# Patient Record
(demographics unavailable — no encounter records)

---

## 2024-10-29 NOTE — REVIEW OF SYSTEMS
[Vision Problems] : vision problems [Nasal Discharge] : nasal discharge [Heartburn] : heartburn [Joint Pain] : joint pain [Back Pain] : back pain [Mole Changes] : mole changes [Dizziness] : dizziness [Fainting] : fainting [Easy Bruising] : easy bruising [Negative] : Heme/Lymph

## 2024-10-29 NOTE — HEALTH RISK ASSESSMENT
[No] : In the past 12 months have you used drugs other than those required for medical reasons? No [No falls in past year] : Patient reported no falls in the past year [Patient refused screening] : Patient refused screening [de-identified] : Surgical oncology, nephrology, endocrine, GYN oncology [de-identified] : Walking [de-identified] : Regular [Reviewed no changes] : Reviewed, no changes [AdvancecareDate] : 10/24 [Never] : Never

## 2024-10-29 NOTE — ASSESSMENT
[FreeTextEntry1] : Abnormal chest CT of unclear etiology Most recent chest CT remains stable See scanned PFTs done today She has declined bronchoscopy with transbronchial biopsy or open lung biopsy for tissue diagnosis Remains asymptomatic from a pulmonary perspective. Will continue to monitor chest CT and PFTs at least annually She will call if her status changes or worsens or for any medical/pulmonary issues and return to be seen immediately She wishes to return for her annual physical and as needed All of the above was discussed with the patient and all questions were answered She verbally confirmed understanding of all of the above and agreement with the above plan

## 2024-10-29 NOTE — HISTORY OF PRESENT ILLNESS
[FreeTextEntry1] : Comes in for follow-up medical visit.  [de-identified] : Had PFTs today and here to discuss.  Reports that she took her influenza and COVID vaccines on Saturday. Feeling well. Denies any chest pain.  Denies any cough or hemoptysis.  Denies any increased shortness of breath or wheezing. No other new complaints today.

## 2024-10-29 NOTE — PHYSICAL EXAM
[No Acute Distress] : no acute distress [Well Nourished] : well nourished [Well Developed] : well developed [Well-Appearing] : well-appearing [Normal Voice/Communication] : normal voice/communication [Normal Sclera/Conjunctiva] : normal sclera/conjunctiva [Normal Outer Ear/Nose] : the outer ears and nose were normal in appearance [No JVD] : no jugular venous distention [Supple] : supple [No Respiratory Distress] : no respiratory distress  [No Accessory Muscle Use] : no accessory muscle use [Clear to Auscultation] : lungs were clear to auscultation bilaterally [Normal Rate] : normal rate  [Regular Rhythm] : with a regular rhythm [Normal S1, S2] : normal S1 and S2 [No Carotid Bruits] : no carotid bruits [No Edema] : there was no peripheral edema [No Extremity Clubbing/Cyanosis] : no extremity clubbing/cyanosis [Declined Breast Exam] : declined breast exam  [Soft] : abdomen soft [Normal Bowel Sounds] : normal bowel sounds [Declined Rectal Exam] : declined rectal exam [No CVA Tenderness] : no CVA  tenderness [No Spinal Tenderness] : no spinal tenderness [No Rash] : no rash [No Focal Deficits] : no focal deficits [Normal Gait] : normal gait [Speech Grossly Normal] : speech grossly normal [Normal Affect] : the affect was normal [Alert and Oriented x3] : oriented to person, place, and time [de-identified] : No stridor [de-identified] : R = 16; good air entry [de-identified] : Normal bilateral radial pulses; no cords [de-identified] : Declined/as per GYN

## 2024-11-07 NOTE — CONSULT LETTER
[Dear  ___] : Dear  [unfilled], [Consult Letter:] : I had the pleasure of evaluating your patient, [unfilled]. [Please see my note below.] : Please see my note below. [Consult Closing:] : Thank you very much for allowing me to participate in the care of this patient.  If you have any questions, please do not hesitate to contact me. [Sincerely,] : Sincerely, [FreeTextEntry3] : Reina Nolen MD, [facility]  of Dermatology Director of Dermatologic Surgery

## 2024-11-07 NOTE — ASSESSMENT
[FreeTextEntry1] : # Actinic keratoses x3 on nose, 3 on forehead    - Discussed nature of lesions and risk of malignant transformation to SCC - Treated with cryotherapy at today's visit Procedure note: Cryotherapy  Verbal consent obtained. The risks/benefits/alternatives discussed including but not limited to risk of pain, inflammatory and blistering reaction, infection, risk of permanent scar and/or dyspigmentation, recurrence, possible lack of efficacy and need for repeat treatments. Site confirmed. 2 lesions treated with cryotherapy: liquid nitrogen x2 rapid freeze-slow thaw cycles. Discussed wound care instructions. Patient tolerated well with no immediate complications.  #Full body skin check for skin cancer screening - no concerning lesions for skin cancer today - I have counseled the patient on the importance of sun protection and monthly self skin exams at home. We have discussed proper sun protection habits and techniques, monthly self-skin exams. I have asked the patient to notify me of any new or changing lesions.   #Multiple benign melanocytic nevi, solar lentigines, cherry angiomas  - ABCDE of melanoma reviewed - reassurance provided regarding nevi - monthly self-skin checks advised  # History of SCC of skin - S/p excision on L upper back 10/2023, [facility] - S/p Mohs on L lateral eyebrow 11/2023, [facility]  RTC in 1 year for TBSE, sooner PRN

## 2024-11-07 NOTE — ASSESSMENT
[FreeTextEntry1] : # Actinic keratoses x3 on nose, 3 on forehead    - Discussed nature of lesions and risk of malignant transformation to SCC - Treated with cryotherapy at today's visit Procedure note: Cryotherapy  Verbal consent obtained. The risks/benefits/alternatives discussed including but not limited to risk of pain, inflammatory and blistering reaction, infection, risk of permanent scar and/or dyspigmentation, recurrence, possible lack of efficacy and need for repeat treatments. Site confirmed. 2 lesions treated with cryotherapy: liquid nitrogen x2 rapid freeze-slow thaw cycles. Discussed wound care instructions. Patient tolerated well with no immediate complications.  #Full body skin check for skin cancer screening - no concerning lesions for skin cancer today - I have counseled the patient on the importance of sun protection and monthly self skin exams at home. We have discussed proper sun protection habits and techniques, monthly self-skin exams. I have asked the patient to notify me of any new or changing lesions.   #Multiple benign melanocytic nevi, solar lentigines, cherry angiomas  - ABCDE of melanoma reviewed - reassurance provided regarding nevi - monthly self-skin checks advised  # History of SCC of skin - S/p excision on L upper back 10/2023, [facility] - S/p Mohs on L lateral eyebrow 11/2023, [facility]  RTC in 1 year for TBSE, sooner PRN

## 2024-11-07 NOTE — CONSULT LETTER
[Dear  ___] : Dear  [unfilled], [Consult Letter:] : I had the pleasure of evaluating your patient, [unfilled]. [Please see my note below.] : Please see my note below. [Consult Closing:] : Thank you very much for allowing me to participate in the care of this patient.  If you have any questions, please do not hesitate to contact me. [Sincerely,] : Sincerely, [FreeTextEntry3] : Reina Nolen MD, [facility]  of Dermatology Director of Dermatologic Surgery

## 2024-11-07 NOTE — PHYSICAL EXAM
[Alert] : alert [Oriented x 3] : ~L oriented x 3 [Well Nourished] : well nourished [Conjunctiva Non-injected] : conjunctiva non-injected [No Visual Lymphadenopathy] : no visual  lymphadenopathy [No Clubbing] : no clubbing [No Edema] : no edema [No Bromhidrosis] : no bromhidrosis [No Chromhidrosis] : no chromhidrosis [FreeTextEntry3] :  Full body skin exam Total body skin exam performed including hair, scalp, face, neck, chest/breasts/axillae, abdomen, back, R upper ext, L upper ext, buttock/groin, R lower ext, L lower ext, hands, feet, digits/nails. The following of note   tan/brown macules scattered throughout trunk/extremities, no concerning features on dermoscopy vascular red papules scattered throughout trunk/extremities erythematous papules, gritty scale on nose and forehead  healing pink linear scar on L forehead healing linear scar on L upper back/posterior shoulder

## 2024-11-07 NOTE — HISTORY OF PRESENT ILLNESS
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 S/p LN2 of AKs on L cheek at [facility]  #Moles none new or concerning today requesting FBSE   Skin cancer hx: as above (SCC L upper back s/p excision 10/2023, SCC L eyebrow s/p Mohs 11/2023) Social hx: lives in [place]

## 2024-11-07 NOTE — HISTORY OF PRESENT ILLNESS
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 S/p LN2 of AKs on L cheek at [facility]  #Moles none new or concerning today requesting FBSE   Skin cancer hx: as above (SCC L upper back s/p excision 10/2023, SCC L eyebrow s/p Mohs 11/2023) Social hx: lives in [place]

## 2024-11-22 NOTE — ASSESSMENT
[FreeTextEntry1] : Ms. Padron is an 82 y/o woman with h/o endometrial cancer ( Stage 1A) s/p surgery, long standing DM, HTN and CKD here for a follow up visit  #CKD (stage 3): - Her CKD is likely secondary to long standing DM/HTN. Urine MAlb/Cr: normal   - Baseline creatinine has been ranging between 1.6-1.9 mg/dl. 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  She no longer has episodes of dizziness that she was experiencing previously  - encouraged to continue recording home BP  #Anemia: Check iron and hemoglobin f/u in 4 months ADDENDUM: Kidney function stable and at baseline.  No proteinuria Hemoglobin is close to goal,  will start JAMEE if it drops to less than 9.  Iron stores replete Calcium and Phos are okay.  Vitamin D replete. PTH in normal range Discussed results with her

## 2024-11-22 NOTE — ASSESSMENT
[FreeTextEntry1] : Ms. Padron is an 80 y/o woman with h/o endometrial cancer ( Stage 1A) s/p surgery, long standing DM, HTN and CKD here for a follow up visit  #CKD (stage 3): - Her CKD is likely secondary to long standing DM/HTN. Urine MAlb/Cr: normal   - Baseline creatinine has been ranging between 1.6-1.9 mg/dl. 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  She no longer has episodes of dizziness that she was experiencing previously  - encouraged to continue recording home BP  #Anemia: Check iron and hemoglobin f/u in 4 months ADDENDUM: Kidney function stable and at baseline.  No proteinuria Hemoglobin is close to goal,  will start JAMEE if it drops to less than 9.  Iron stores replete Calcium and Phos are okay.  Vitamin D replete. PTH in normal range Discussed results with her

## 2024-11-22 NOTE — REASON FOR VISIT
Pt needs to schedule follow up visit first, then may refill til that visit.    [FreeTextEntry1] : CKD

## 2024-11-22 NOTE — HISTORY OF PRESENT ILLNESS
[FreeTextEntry1] : Ms Padron is an 81 y/o woman with H/O DM, Endometrial cancer ( stage 1A) , HTN and CKD (baseline creatinine 1.5-1.7 mg/dl) who comes in for a follow up visit today.    In 2020, she noted an episode of profound weakness with a presyncopal feeling and was referred to a cardiologist, had TTE which revealed stage 2 diastolic HF, no valvular disease, EKG NSR. Was found to be orthostatic during an office visit and was taken off of Amlodipine at bedtime. Had a repeat renal Sono in May 2020 which showed stable R Renal cyst.   The patient was evaluated by a neurosurgeon due to left carotid stenosis. The patient underwent a catheter cerebral angiogram in 8/2021 revealing 40% stenosis of the R carotid artery. Blood work was repeated one month following the contrast administration with stable renal function.   She had a syncopal episode in Feb/March (2023). This was preceded by left thigh pain while shopping at a store. She subsequently lost consciousness. Was taken to the ER. Had imaging done which was negative, also received IVF. Her blood work showed stable kidney function. The K was marginally up initially but improved the next day. 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  Admitted in Dec 2023 for chest pain. Stress test with fixed defects. Conservative mgt Covid in Feb. Not given paxlovid. Treated at home  July: Today, she reports that because her last A1c in April 2024 was 6.5% and her blood sugars have been running low at home, her endo advised her to stop her Prandin and told her that she would like her A1c to be in the 7s range to avoid hypoglycemic episodes. In addition, pt brought in some home BP recordings along with her new BP monitor. Upon reviewing her BP logs, she has had several recordings with SBP in low 100s and a few even below 100. Pt stated she does feel like she wants to pass out at times, especially upon standing up, although this is not necessarily new for her given her hx of dizziness. Her logs also show persistently lower HR in 50-60 range. Her vitals in the office today show /66 and HR 69. Upon rechecking her BP manually and with her home monitor, BP was elevated in the office, but the recordings were consistent with home monitor and office reading. Pt has been taking her Chlorthalidone TIW, along with amlodipine-benazepril and carvedilol.         Nov: Comes for a follow up visit today. Feels pretty good. +fatigue. Denies any lower extremity edema, SOB. No nausea or vomiting.   No recent infections or hospitalizations. BP at home has been in the 120-150/50-60mm  Hg. No more e/o dizziness since reducing Coreg.  CT chest with stable nodules.  Mammogram okay

## 2025-02-21 NOTE — DISCUSSION/SUMMARY
[FreeTextEntry1] : She is a 83-year-old with hypertension, hypercholesterolemia hypothyroidism and CKD who has a history of orthostatic syncope and now presents with vasovagal syncope. ECG: NSR. PRWP. Unchanged from prior. Chest pain: StressNone to report.  Prior abnormal test is being treated with continued medical management including aspirin and statin therapy. Encourage exercise. Palpitations: Some concern for arrhythmia though no high risk features are seen. I have placed a 2-week cardiac monitor to exclude any arrhythmia such as atrial fibrillation which would require more aggressive treatment.   HTN: Borderline control on her current medical regimen, but given her prior orthostasis and syncope allowance for a 6 slightly elevated blood pressure makes sense. Plan reviewed with her and her granddaughter. Semiannual follow-up, or if new symptoms arise. [EKG obtained to assist in diagnosis and management of assessed problem(s)] : EKG obtained to assist in diagnosis and management of assessed problem(s)

## 2025-02-21 NOTE — HISTORY OF PRESENT ILLNESS
----- Message from April Horton MD sent at 8/12/2020  1:07 PM CDT -----  Please get these results to her renal MD asap   [FreeTextEntry1] : She has been doing well overall and has not had any further episodes of syncope or near syncope.  She is taking her diuretic at half dose 3 times a week.  Half dose carvedilol as well. She does report episodes of palpitations since December.  She describes fluttering in her chest that occurs at rest either while sitting or lying down that could last up to 5 minutes without any other associated symptoms aside from pounding in her chest. She has no exertional chest pains or shortness of breath and is taking her medications as directed.  Most recent LDL was in the 70s.   Prior: Recent hospitalization for chest pain. Nuclear stress with Predominantly fixed defects noted. No further chest pain. Off chlorthalidone due to unclear discharge instructions. Now more anemic than usual. Cr. 1.7 LDL at goal  Prior: No further syncopal episodes. She does note some lightheadedness, transiently while standing. No new medications. No bleeding but bruises easily. LDL 53  Prior: Severe left leg pain, Nausea, syncope. Head injury. Went to the ER. CT head, normal.  Seen by her nephrologist and was noted to be orthostatic.  No further episodes. Not orthostatic today.  Prior: She is a 78-year-old with a history of hypertension, hypercholesterolemia, hypothyroidism and CKD who has been having episodes of lightheadedness over the past few weeks.  She describes an episode where she felt near passing out while in the shower the dizziness was different from her usual vertigo and her symptoms improved after lying down flat on the bed. She also had multiple episodes of lightheadedness while standing in line that resolved spontaneously. She denied any syncope. She has no history of coronary artery disease, smoking or known congestive heart failure. Prior cardiac work-up in 2016 showed poor exercise capacity of 6 METS with normal ECG and echocardiographic response to exercise.

## 2025-03-13 NOTE — PHYSICAL EXAM
[General Appearance - Alert] : alert [General Appearance - In No Acute Distress] : in no acute distress [Examination Of The Oral Cavity] : the lips and gums were normal [Oropharynx] : the oropharynx was normal [Neck Appearance] : the appearance of the neck was normal [Respiration, Rhythm And Depth] : normal respiratory rhythm and effort [Exaggerated Use Of Accessory Muscles For Inspiration] : no accessory muscle use [Auscultation Breath Sounds / Voice Sounds] : lungs were clear to auscultation bilaterally [Heart Rate And Rhythm] : heart rate was normal and rhythm regular [Heart Sounds] : normal S1 and S2 [Heart Sounds Gallop] : no gallops [Murmurs] : no murmurs [Heart Sounds Pericardial Friction Rub] : no pericardial rub [Edema] : there was no peripheral edema [No CVA Tenderness] : no ~M costovertebral angle tenderness [Involuntary Movements] : no involuntary movements were seen [FreeTextEntry1] : some mild bruising over the forehead [Oriented To Time, Place, And Person] : oriented to person, place, and time [Impaired Insight] : insight and judgment were intact [Affect] : the affect was normal [] : no rash

## 2025-03-14 NOTE — REVIEW OF SYSTEMS
[As Noted in HPI] : as noted in HPI [Dizziness] : dizziness [Negative] : Heme/Lymph [Fainting] : no fainting

## 2025-03-14 NOTE — HISTORY OF PRESENT ILLNESS
[FreeTextEntry1] : Ms Padron is an 84 y/o woman with H/O DM, Endometrial cancer ( stage 1A) , HTN and CKD (baseline creatinine 1.5-1.7 mg/dl) who comes in for a follow up visit today.    In 2020, she noted an episode of profound weakness with a presyncopal feeling and was referred to a cardiologist, had TTE which revealed stage 2 diastolic HF, no valvular disease, EKG NSR. Was found to be orthostatic during an office visit and was taken off of Amlodipine at bedtime. Had a repeat renal Sono in May 2020 which showed stable R Renal cyst.   The patient was evaluated by a neurosurgeon due to left carotid stenosis. The patient underwent a catheter cerebral angiogram in 8/2021 revealing 40% stenosis of the R carotid artery. Blood work was repeated one month following the contrast administration with stable renal function.   She had a syncopal episode in Feb/March (2023). This was preceded by left thigh pain while shopping at a store. She subsequently lost consciousness. Was taken to the ER. Had imaging done which was negative, also received IVF. Her blood work showed stable kidney function. The K was marginally up initially but improved the next day. 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  Admitted in Dec 2023 for chest pain. Stress test with fixed defects. Conservative mgt Covid in Feb. Not given paxlovid. Treated at home  July: Today, she reports that because her last A1c in April 2024 was 6.5% and her blood sugars have been running low at home, her endo advised her to stop her Prandin and told her that she would like her A1c to be in the 7s range to avoid hypoglycemic episodes. In addition, pt brought in some home BP recordings along with her new BP monitor. Upon reviewing her BP logs, she has had several recordings with SBP in low 100s and a few even below 100. Pt stated she does feel like she wants to pass out at times, especially upon standing up, although this is not necessarily new for her given her hx of dizziness. Her logs also show persistently lower HR in 50-60 range. Her vitals in the office today show /66 and HR 69. Upon rechecking her BP manually and with her home monitor, BP was elevated in the office, but the recordings were consistent with home monitor and office reading. Pt has been taking her Chlorthalidone TIW, along with amlodipine-benazepril and carvedilol.         Nov: Comes for a follow up visit today. Feels pretty good. +fatigue. Denies any lower extremity edema, SOB. No nausea or vomiting.   No recent infections or hospitalizations. BP at home has been in the 120-150/50-60mm  Hg. No more e/o dizziness since reducing Coreg.  CT chest with stable nodules.  Mammogram okay March: No recent hospitalization or infection. No recent medication changes. BP was up to 180 in am , then came down to 140   home BP log reviewed, readings in the: 133-150/65.  She is not sure if the cuff at home is accurate Had been experiencing palpitations and wore a cardiac monitor for 2 weeks.  The results are pending are pending.  She states that she did  not experience palpitations at the time of the monitor

## 2025-03-14 NOTE — ASSESSMENT
[FreeTextEntry1] : Ms. Padron is an 82 y/o woman with h/o endometrial cancer ( Stage 1A) s/p surgery, long standing DM, HTN and CKD here for a follow up visit  #CKD (stage 3): - Her CKD is likely secondary to long standing DM/HTN. Urine MAlb/Cr: normal   - Baseline creatinine has been ranging between 1.6-1.9 mg/dl. -Repeat labs today noted.  Kidney function stable with creatinine at 1.6 mg/dL.  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  She no longer has episodes of dizziness that she was experiencing previously  - encouraged to buy a new blood pressure machine  #Anemia: Iron and hemoglobin noted.  Hemoglobin stable at 10 g/dL f/u in 4 months   Discussed results with her

## 2025-03-14 NOTE — ASSESSMENT
[FreeTextEntry1] : Ms. Padron is an 84 y/o woman with h/o endometrial cancer ( Stage 1A) s/p surgery, long standing DM, HTN and CKD here for a follow up visit  #CKD (stage 3): - Her CKD is likely secondary to long standing DM/HTN. Urine MAlb/Cr: normal   - Baseline creatinine has been ranging between 1.6-1.9 mg/dl. -Repeat labs today noted.  Kidney function stable with creatinine at 1.6 mg/dL.  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  She no longer has episodes of dizziness that she was experiencing previously  - encouraged to buy a new blood pressure machine  #Anemia: Iron and hemoglobin noted.  Hemoglobin stable at 10 g/dL f/u in 4 months   Discussed results with her

## 2025-03-14 NOTE — HISTORY OF PRESENT ILLNESS
[FreeTextEntry1] : Ms Padron is an 82 y/o woman with H/O DM, Endometrial cancer ( stage 1A) , HTN and CKD (baseline creatinine 1.5-1.7 mg/dl) who comes in for a follow up visit today.    In 2020, she noted an episode of profound weakness with a presyncopal feeling and was referred to a cardiologist, had TTE which revealed stage 2 diastolic HF, no valvular disease, EKG NSR. Was found to be orthostatic during an office visit and was taken off of Amlodipine at bedtime. Had a repeat renal Sono in May 2020 which showed stable R Renal cyst.   The patient was evaluated by a neurosurgeon due to left carotid stenosis. The patient underwent a catheter cerebral angiogram in 8/2021 revealing 40% stenosis of the R carotid artery. Blood work was repeated one month following the contrast administration with stable renal function.   She had a syncopal episode in Feb/March (2023). This was preceded by left thigh pain while shopping at a store. She subsequently lost consciousness. Was taken to the ER. Had imaging done which was negative, also received IVF. Her blood work showed stable kidney function. The K was marginally up initially but improved the next day. 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  Admitted in Dec 2023 for chest pain. Stress test with fixed defects. Conservative mgt Covid in Feb. Not given paxlovid. Treated at home  July: Today, she reports that because her last A1c in April 2024 was 6.5% and her blood sugars have been running low at home, her endo advised her to stop her Prandin and told her that she would like her A1c to be in the 7s range to avoid hypoglycemic episodes. In addition, pt brought in some home BP recordings along with her new BP monitor. Upon reviewing her BP logs, she has had several recordings with SBP in low 100s and a few even below 100. Pt stated she does feel like she wants to pass out at times, especially upon standing up, although this is not necessarily new for her given her hx of dizziness. Her logs also show persistently lower HR in 50-60 range. Her vitals in the office today show /66 and HR 69. Upon rechecking her BP manually and with her home monitor, BP was elevated in the office, but the recordings were consistent with home monitor and office reading. Pt has been taking her Chlorthalidone TIW, along with amlodipine-benazepril and carvedilol.         Nov: Comes for a follow up visit today. Feels pretty good. +fatigue. Denies any lower extremity edema, SOB. No nausea or vomiting.   No recent infections or hospitalizations. BP at home has been in the 120-150/50-60mm  Hg. No more e/o dizziness since reducing Coreg.  CT chest with stable nodules.  Mammogram okay March: No recent hospitalization or infection. No recent medication changes. BP was up to 180 in am , then came down to 140   home BP log reviewed, readings in the: 133-150/65.  She is not sure if the cuff at home is accurate Had been experiencing palpitations and wore a cardiac monitor for 2 weeks.  The results are pending are pending.  She states that she did  not experience palpitations at the time of the monitor

## 2025-04-24 NOTE — REVIEW OF SYSTEMS
[Vision Problems] : vision problems [Nasal Discharge] : nasal discharge [Heartburn] : heartburn [Joint Pain] : joint pain [Back Pain] : back pain [Dizziness] : dizziness [Fainting] : fainting [Easy Bruising] : easy bruising [Negative] : Heme/Lymph [Palpitations] : palpitations

## 2025-04-24 NOTE — ASSESSMENT
[Vaccines Reviewed] : Immunizations reviewed today. Please see immunization details in the vaccine log within the immunization flowsheet.  [FreeTextEntry1] : See health maintenance assessment and plan outlined below. In addition: Full fasting labs and urine testing done today Bone density done August 2024 Podiatry f/u for diabetic foot care 2025 Vascular f/u 2025 Ortho f/u as needed 2025 Had colonoscopy December 2021 as per patient Had EGD 2023 as per patient GI follow-up 2025 Seeing Dr. White April 2025 To do annual mammograms/US August 2025  To f/u with surgical oncology 2025 Renal f/u 2025 for CKD Continue meds, diet, exercise as outlined above Cardiology f/u 2025 EKG declined here today To do f/u CT chest 2025  Will do PFTs at next visit here 2025 Continues to decline FOB for tissue diagnosis 2025 Endocrine f/u 2025 Vaccines d/w patient To f/u with derm 2025 Dental follow-up 2025 To see ophthalmology 2025 for diabetic eye care Neuro f/u 2025 RTC for annual physical  RTC 6 months with PFTs and as needed To call for any medical issues or if her status worsens/changes and to return to be seen immediately All of the above was discussed in detail with her and all of her questions were answered She verbally confirmed understanding of all of the above and agreement with the above plan Requested prescriptions renewed

## 2025-04-24 NOTE — PHYSICAL EXAM
[No Acute Distress] : no acute distress [Well Nourished] : well nourished [Well Developed] : well developed [Well-Appearing] : well-appearing [Normal Voice/Communication] : normal voice/communication [Normal Sclera/Conjunctiva] : normal sclera/conjunctiva [PERRL] : pupils equal round and reactive to light [EOMI] : extraocular movements intact [Normal Outer Ear/Nose] : the outer ears and nose were normal in appearance [Normal Oropharynx] : the oropharynx was normal [Normal TMs] : both tympanic membranes were normal [No JVD] : no jugular venous distention [Supple] : supple [No Lymphadenopathy] : no lymphadenopathy [No Respiratory Distress] : no respiratory distress  [Clear to Auscultation] : lungs were clear to auscultation bilaterally [No Accessory Muscle Use] : no accessory muscle use [Normal Rate] : normal rate  [Regular Rhythm] : with a regular rhythm [Normal S1, S2] : normal S1 and S2 [No Carotid Bruits] : no carotid bruits [Pedal Pulses Present] : the pedal pulses are present [No Edema] : there was no peripheral edema [No Extremity Clubbing/Cyanosis] : no extremity clubbing/cyanosis [Normal Appearance] : normal in appearance [No Nipple Discharge] : no nipple discharge [No Axillary Lymphadenopathy] : no axillary lymphadenopathy [Soft] : abdomen soft [Non Tender] : non-tender [Non-distended] : non-distended [No Masses] : no abdominal mass palpated [No HSM] : no HSM [Normal Bowel Sounds] : normal bowel sounds [Declined Rectal Exam] : declined rectal exam [Normal Supraclavicular Nodes] : no supraclavicular lymphadenopathy [Normal Axillary Nodes] : no axillary lymphadenopathy [Normal Posterior Cervical Nodes] : no posterior cervical lymphadenopathy [Normal Anterior Cervical Nodes] : no anterior cervical lymphadenopathy [No CVA Tenderness] : no CVA  tenderness [No Spinal Tenderness] : no spinal tenderness [Grossly Normal Strength/Tone] : grossly normal strength/tone [No Rash] : no rash [Normal Gait] : normal gait [Coordination Grossly Intact] : coordination grossly intact [No Focal Deficits] : no focal deficits [Speech Grossly Normal] : speech grossly normal [Normal Affect] : the affect was normal [Alert and Oriented x3] : oriented to person, place, and time [Memory Grossly Normal] : memory grossly normal [Normal Mood] : the mood was normal [Normal Insight/Judgement] : insight and judgment were intact [de-identified] : No ST [de-identified] : no stridor or TM [de-identified] : R=16 [de-identified] : normal radial pulses; no cords [de-identified] : as per GYN

## 2025-04-24 NOTE — HEALTH RISK ASSESSMENT
[Good] : ~his/her~  mood as  good [No] : In the past 12 months have you used drugs other than those required for medical reasons? No [No falls in past year] : Patient reported no falls in the past year [0] : 2) Feeling down, depressed, or hopeless: Not at all (0) [PHQ-2 Negative - No further assessment needed] : PHQ-2 Negative - No further assessment needed [PHQ-9 Negative - No further assessment needed] : PHQ-9 Negative - No further assessment needed [Never] : Never [Retinopathy] : Retinopathy [HIV test declined] : HIV test declined [Hepatitis C test declined] : Hepatitis C test declined [Alone] : lives alone [# of Members in Household ___] :  household currently consist of [unfilled] member(s) [Retired] : retired [] :  [# Of Children ___] : has [unfilled] children [Feels Safe at Home] : Feels safe at home [Fully functional (bathing, dressing, toileting, transferring, walking, feeding)] : Fully functional (bathing, dressing, toileting, transferring, walking, feeding) [Fully functional (using the telephone, shopping, preparing meals, housekeeping, doing laundry, using] : Fully functional and needs no help or supervision to perform IADLs (using the telephone, shopping, preparing meals, housekeeping, doing laundry, using transportation, managing medications and managing finances) [Smoke Detector] : smoke detector [Carbon Monoxide Detector] : carbon monoxide detector [Seat Belt] :  uses seat belt [Sunscreen] : uses sunscreen [With Patient/Caregiver] : , with patient/caregiver [Designated Healthcare Proxy] : Designated healthcare proxy [Name: ___] : Health Care Proxy's Name: [unfilled]  [Relationship: ___] : Relationship: [unfilled] [Fair] : ~his/her~ current health as fair  [Little interest or pleasure doing things] : 1) Little interest or pleasure doing things [Feeling down, depressed, or hopeless] : 2) Feeling down, depressed, or hopeless [None] : Patient does not have any barriers to medication adherence [Yes] : Reviewed medication list for presence of high-risk medications. [Opioids] : opioids [NO] : No [de-identified] : GYN/Onc, endocrine, renal, dermatology, surg/onc,cardiology, GI, ophtho [de-identified] : exercises [de-identified] : ADA [[id number]] : 0 [EyeExamDate] : 2024 [Change in mental status noted] : No change in mental status noted [Reports changes in hearing] : Reports no changes in hearing [Reports changes in vision] : Reports no changes in vision [Reports changes in dental health] : Reports no changes in dental health [Travel to Developing Areas] : does not  travel to developing areas [TB Exposure] : is not being exposed to tuberculosis [Caregiver Concerns] : does not have caregiver concerns [MammogramDate] : 08/24 [PapSmearDate] : 04/24 [BoneDensityDate] : 08/24 [ColonoscopyDate] : 12/21 [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

## 2025-07-16 NOTE — PHYSICAL EXAM
[General Appearance - Alert] : alert [General Appearance - In No Acute Distress] : in no acute distress [Examination Of The Oral Cavity] : the lips and gums were normal [Oropharynx] : the oropharynx was normal [Neck Appearance] : the appearance of the neck was normal [Respiration, Rhythm And Depth] : normal respiratory rhythm and effort [Exaggerated Use Of Accessory Muscles For Inspiration] : no accessory muscle use [Auscultation Breath Sounds / Voice Sounds] : lungs were clear to auscultation bilaterally [Heart Rate And Rhythm] : heart rate was normal and rhythm regular [Heart Sounds] : normal S1 and S2 [Heart Sounds Gallop] : no gallops [Murmurs] : no murmurs [Heart Sounds Pericardial Friction Rub] : no pericardial rub [Edema] : there was no peripheral edema [No CVA Tenderness] : no ~M costovertebral angle tenderness [Involuntary Movements] : no involuntary movements were seen [] : no rash [Oriented To Time, Place, And Person] : oriented to person, place, and time [Impaired Insight] : insight and judgment were intact [Affect] : the affect was normal

## 2025-07-18 NOTE — ASSESSMENT
[FreeTextEntry1] : Ms. Padron is an 84 y/o woman with h/o endometrial cancer ( Stage 1A) s/p surgery, long standing DM, HTN and CKD here for a follow up visit  #CKD (stage 3): - Her CKD is likely secondary to long standing DM/HTN. Urine MAlb/Cr:  normal   - Baseline creatinine has been ranging between 1.6-1.9 mg/dl.  -Repeat labs today noted.  Kidney function stable with creatinine at 1.6 mg/dL.  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  She no longer has episodes of dizziness that she was experiencing previously    #Anemia: Iron and hemoglobin noted.  Hemoglobin slightly lower at 9.7 mg/dl .Monitor f/u in 4 months   Discussed results with her

## 2025-07-18 NOTE — REVIEW OF SYSTEMS
[As Noted in HPI] : as noted in HPI [Negative] : Endocrine [Dizziness] : no dizziness [Fainting] : no fainting

## 2025-07-18 NOTE — HISTORY OF PRESENT ILLNESS
[FreeTextEntry1] : Ms Padron is an 84 y/o woman with H/O DM, Endometrial cancer ( stage 1A) , HTN and CKD (baseline creatinine 1.5-1.7 mg/dl) who comes in for a follow up visit today.    In 2020, she noted an episode of profound weakness with a presyncopal feeling and was referred to a cardiologist, had TTE which revealed stage 2 diastolic HF, no valvular disease, EKG NSR. Was found to be orthostatic during an office visit and was taken off of Amlodipine at bedtime. Had a repeat renal Sono in May 2020 which showed stable R Renal cyst.   The patient was evaluated by a neurosurgeon due to left carotid stenosis. The patient underwent a catheter cerebral angiogram in 8/2021 revealing 40% stenosis of the R carotid artery. Blood work was repeated one month following the contrast administration with stable renal function.   She had a syncopal episode in Feb/March (2023). This was preceded by left thigh pain while shopping at a store. She subsequently lost consciousness. Was taken to the ER. Had imaging done which was negative, also received IVF. Her blood work showed stable kidney function. The K was marginally up initially but improved the next day. 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  Admitted in Dec 2023 for chest pain. Stress test with fixed defects. Conservative mgt Covid in Feb. Not given paxlovid. Treated at home  July 2024: Today, she reports that because her last A1c in April 2024 was 6.5% and her blood sugars have been running low at home, her endo advised her to stop her Prandin and told her that she would like her A1c to be in the 7s range to avoid hypoglycemic episodes. In addition, pt brought in some home BP recordings along with her new BP monitor. Upon reviewing her BP logs, she has had several recordings with SBP in low 100s and a few even below 100. Pt stated she does feel like she wants to pass out at times, especially upon standing up, although this is not necessarily new for her given her hx of dizziness. Her logs also show persistently lower HR in 50-60 range. Her vitals in the office today show /66 and HR 69. Upon rechecking her BP manually and with her home monitor, BP was elevated in the office, but the recordings were consistent with home monitor and office reading. Pt has been taking her Chlorthalidone TIW, along with amlodipine-benazepril and carvedilol.         Nov 2024: Comes for a follow up visit today. Feels pretty good. +fatigue. Denies any lower extremity edema, SOB. No nausea or vomiting.   No recent infections or hospitalizations. BP at home has been in the 120-150/50-60mm  Hg. No more e/o dizziness since reducing Coreg.  CT chest with stable nodules.  Mammogram okay March: No recent hospitalization or infection. No recent medication changes. BP was up to 180 in am , then came down to 140   home BP log reviewed, readings in the: 133-150/65.  She is not sure if the cuff at home is accurate Had been experiencing palpitations and wore a cardiac monitor for 2 weeks.  The results are pending are pending.  She states that she did  not experience palpitations at the time of the monitor  July 2025: Not on any diabetic meds anymore No recent hospitalization or infection.   BP has been good ( in the 120-130 range) Cardiac monitor with no arrythmia has developed swelling wit heat

## 2025-07-18 NOTE — ASSESSMENT
[FreeTextEntry1] : Ms. Padron is an 82 y/o woman with h/o endometrial cancer ( Stage 1A) s/p surgery, long standing DM, HTN and CKD here for a follow up visit  #CKD (stage 3): - Her CKD is likely secondary to long standing DM/HTN. Urine MAlb/Cr:  normal   - Baseline creatinine has been ranging between 1.6-1.9 mg/dl.  -Repeat labs today noted.  Kidney function stable with creatinine at 1.6 mg/dL.  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  She no longer has episodes of dizziness that she was experiencing previously    #Anemia: Iron and hemoglobin noted.  Hemoglobin slightly lower at 9.7 mg/dl .Monitor f/u in 4 months   Discussed results with her

## 2025-07-18 NOTE — HISTORY OF PRESENT ILLNESS
[FreeTextEntry1] : Ms Padron is an 82 y/o woman with H/O DM, Endometrial cancer ( stage 1A) , HTN and CKD (baseline creatinine 1.5-1.7 mg/dl) who comes in for a follow up visit today.    In 2020, she noted an episode of profound weakness with a presyncopal feeling and was referred to a cardiologist, had TTE which revealed stage 2 diastolic HF, no valvular disease, EKG NSR. Was found to be orthostatic during an office visit and was taken off of Amlodipine at bedtime. Had a repeat renal Sono in May 2020 which showed stable R Renal cyst.   The patient was evaluated by a neurosurgeon due to left carotid stenosis. The patient underwent a catheter cerebral angiogram in 8/2021 revealing 40% stenosis of the R carotid artery. Blood work was repeated one month following the contrast administration with stable renal function.   She had a syncopal episode in Feb/March (2023). This was preceded by left thigh pain while shopping at a store. She subsequently lost consciousness. Was taken to the ER. Had imaging done which was negative, also received IVF. Her blood work showed stable kidney function. The K was marginally up initially but improved the next day. 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  Admitted in Dec 2023 for chest pain. Stress test with fixed defects. Conservative mgt Covid in Feb. Not given paxlovid. Treated at home  July 2024: Today, she reports that because her last A1c in April 2024 was 6.5% and her blood sugars have been running low at home, her endo advised her to stop her Prandin and told her that she would like her A1c to be in the 7s range to avoid hypoglycemic episodes. In addition, pt brought in some home BP recordings along with her new BP monitor. Upon reviewing her BP logs, she has had several recordings with SBP in low 100s and a few even below 100. Pt stated she does feel like she wants to pass out at times, especially upon standing up, although this is not necessarily new for her given her hx of dizziness. Her logs also show persistently lower HR in 50-60 range. Her vitals in the office today show /66 and HR 69. Upon rechecking her BP manually and with her home monitor, BP was elevated in the office, but the recordings were consistent with home monitor and office reading. Pt has been taking her Chlorthalidone TIW, along with amlodipine-benazepril and carvedilol.         Nov 2024: Comes for a follow up visit today. Feels pretty good. +fatigue. Denies any lower extremity edema, SOB. No nausea or vomiting.   No recent infections or hospitalizations. BP at home has been in the 120-150/50-60mm  Hg. No more e/o dizziness since reducing Coreg.  CT chest with stable nodules.  Mammogram okay March: No recent hospitalization or infection. No recent medication changes. BP was up to 180 in am , then came down to 140   home BP log reviewed, readings in the: 133-150/65.  She is not sure if the cuff at home is accurate Had been experiencing palpitations and wore a cardiac monitor for 2 weeks.  The results are pending are pending.  She states that she did  not experience palpitations at the time of the monitor  July 2025: Not on any diabetic meds anymore No recent hospitalization or infection.   BP has been good ( in the 120-130 range) Cardiac monitor with no arrythmia has developed swelling wit heat

## 2025-07-21 NOTE — PHYSICAL EXAM
[Abnormal] : Examination of vagina: Abnormal [Absent] : Adnexa(ae): Absent [Normal] : Recto-Vaginal Exam: Normal [MA] : MA [FreeTextEntry2] : Callie [de-identified] : 1+ bilateral and = edema [de-identified] : Vertical incision [de-identified] : RT changes and atrophy; shortened due to adhesions

## 2025-07-21 NOTE — HISTORY OF PRESENT ILLNESS
[FreeTextEntry1] : Stage IA (MI-5/15mm, Cx epithelium) Grade 1 Endometrial cancer BRENT, BSO, Staging-12/22/11 Rad Onc-Dr Vincent PCP-Dr Sanchez (was Dr Adames) Gyn-Dr King Surg-Dr GENEVA Briones -Dr Dee Dee Estrada GI-Dr Sheth (was Dr KARON Rodriguez), also Dr YNES Jerome.   Renal-Dr Boyce (was Dr PATRICIA Serrato)  Endo-Dr Irena Georges (was Dr Nagel) Surg Onc/Breast-Dr Sprague (was Dr GENEVA Carlisle)  She RTO feeling well and noting no VB, VD, or pain.    ROS - no GI or  concerns.    BHM by PCP/Dr Sprague, she confirms.

## 2025-07-21 NOTE — DISCUSSION/SUMMARY
[Reviewed Clinical Lab Test(s)] : Results of clinical tests were reviewed. [Reviewed Radiology Report(s)] : Radiology reports were reviewed. [FreeTextEntry1] : She remains JR.   -We discussed her surveillance.   -We have disc her BHM. -Her instructions were reviewed. -All Q/A. -She'll RTO in 12m at her request, sooner prn. -Effort for the visit includes the note prep, review of prior material, interview, exam, further documentation, and coordination of care.

## 2025-07-21 NOTE — PHYSICAL EXAM
[Abnormal] : Examination of vagina: Abnormal [Absent] : Adnexa(ae): Absent [Normal] : Recto-Vaginal Exam: Normal [MA] : MA [FreeTextEntry2] : Callie [de-identified] : 1+ bilateral and = edema [de-identified] : Vertical incision [de-identified] : RT changes and atrophy; shortened due to adhesions